# Patient Record
Sex: MALE
[De-identification: names, ages, dates, MRNs, and addresses within clinical notes are randomized per-mention and may not be internally consistent; named-entity substitution may affect disease eponyms.]

---

## 2024-04-26 ENCOUNTER — APPOINTMENT (OUTPATIENT)
Dept: UROLOGY | Facility: CLINIC | Age: 24
End: 2024-04-26
Payer: COMMERCIAL

## 2024-04-26 VITALS
TEMPERATURE: 98 F | SYSTOLIC BLOOD PRESSURE: 123 MMHG | DIASTOLIC BLOOD PRESSURE: 80 MMHG | WEIGHT: 198 LBS | OXYGEN SATURATION: 98 % | HEIGHT: 72 IN | BODY MASS INDEX: 26.82 KG/M2 | HEART RATE: 57 BPM

## 2024-04-26 DIAGNOSIS — Z78.9 OTHER SPECIFIED HEALTH STATUS: ICD-10-CM

## 2024-04-26 DIAGNOSIS — R68.82 DECREASED LIBIDO: ICD-10-CM

## 2024-04-26 DIAGNOSIS — E29.1 TESTICULAR HYPOFUNCTION: ICD-10-CM

## 2024-04-26 DIAGNOSIS — F52.32 MALE ORGASMIC DISORDER: ICD-10-CM

## 2024-04-26 PROBLEM — Z00.00 ENCOUNTER FOR PREVENTIVE HEALTH EXAMINATION: Status: ACTIVE | Noted: 2024-04-26

## 2024-04-26 LAB
BILIRUB UR QL STRIP: NORMAL
COLLECTION METHOD: NORMAL
GLUCOSE UR-MCNC: NORMAL
HCG UR QL: 0.2 EU/DL
HGB UR QL STRIP.AUTO: NORMAL
KETONES UR-MCNC: NORMAL
LEUKOCYTE ESTERASE UR QL STRIP: NORMAL
NITRITE UR QL STRIP: NORMAL
PH UR STRIP: 6
PROT UR STRIP-MCNC: NORMAL
SP GR UR STRIP: 1.02

## 2024-04-26 PROCEDURE — G2211 COMPLEX E/M VISIT ADD ON: CPT | Mod: NC,1L

## 2024-04-26 PROCEDURE — 81003 URINALYSIS AUTO W/O SCOPE: CPT | Mod: QW

## 2024-04-26 PROCEDURE — 99204 OFFICE O/P NEW MOD 45 MIN: CPT

## 2024-04-26 NOTE — PHYSICAL EXAM
[General Appearance - Well Developed] : well developed [General Appearance - Well Nourished] : well nourished [Normal Appearance] : normal appearance [Well Groomed] : well groomed [General Appearance - In No Acute Distress] : no acute distress [Edema] : no peripheral edema [Respiration, Rhythm And Depth] : normal respiratory rhythm and effort [Exaggerated Use Of Accessory Muscles For Inspiration] : no accessory muscle use [Abdomen Soft] : soft [Abdomen Tenderness] : non-tender [Costovertebral Angle Tenderness] : no ~M costovertebral angle tenderness [Urethral Meatus] : meatus normal [Penis Abnormality] : normal circumcised penis [Urinary Bladder Findings] : the bladder was normal on palpation [Scrotum] : the scrotum was normal [Rectal Exam - Seminal Vesicles] : the seminal vesicles were normal [Epididymis] : the epididymides were normal [Testes Tenderness] : no tenderness of the testes [Testes Mass (___cm)] : there were no testicular masses [Normal Station and Gait] : the gait and station were normal for the patient's age [] : no rash [No Focal Deficits] : no focal deficits [Oriented To Time, Place, And Person] : oriented to person, place, and time [Affect] : the affect was normal [Mood] : the mood was normal [Not Anxious] : not anxious [de-identified] : Normal testicles b/l

## 2024-04-26 NOTE — PLAN

## 2024-04-26 NOTE — HISTORY OF PRESENT ILLNESS
[FreeTextEntry1] : Love is a 23 year old male who presents for evaluation of orgasmic dysfunction. He states that starting around November 2023 he began having diminished sense of orgasm with no ejaculate. He reports that his libido has decreased. He is able to obtain and maintain and erection without much difficulty. Denies any issues with urination, dysuria, abdominal/flank pain, suprpubic discomfrot, pain with ejaculation, or further urological/constitutional symptoms.

## 2024-04-26 NOTE — ASSESSMENT
[FreeTextEntry1] : 1. Orgasmic dysfunction: Decreased sensation of ejaculation with decreased to no ejaculate. -Full hormone panel -HA1c, B12, Folate, Vit D -Seamen analysis -Post ejaculatory urinalysis -F/U for review  Primary anejaculation occurs when men were never able to ejaculate intravaginally.  Secondary anejaculation occurs when the patient has problems with intravaginal ejaculation after normal performance.  Ejaculatory disorders can be classified based on time, volume of ejaculation, and force of ejaculation.   Decreased volume of ejaculation is often the result of medication like alpha blockers or 5 alpha reductase inhibitors however it can also be related to poor arousal and affect your nerve pathways with abnormal function of seminal vesicles and prostate.  Pelvic floor dysfunction with lack of relaxation of pelvic floor can also decrease volume of ejaculation.  Seminal vesicles of obstruction either functional or anatomical can result in ejaculatory volume decreases.  Force of ejaculation depends on contractility of bulbocavernosus muscle, seminal vesicles and prostate.  Tensioning the pelvic floor, trying to push the semen out, low level of arousal can all affect force of ejaculation.  Abnormal closure of bladder neck, BPH are also associated with decreased force of ejaculation.   Evaluation focuses on identifying medical reasons like diabetes, heavy metal poisoning, alcohol abuse, sensory nerve dysfunction, abnormal hormonal profile.   Functional dynamic ejaculatory study allows for direct observation of pattern of stimulation, and answer the question if patient have idiosyncratic pattern of stimulation or tenses up pelvic floor muscles.  Transrectal ultrasound with ejaculatory study allows to better identify if functional or anatomical obstruction exists.   There is no FDA approved medical treatment for ejaculatory dysfunction at this point.   Correction of testosterone has been shown to improve volume of ejaculation, time to ejaculation, and force of ejaculation.

## 2024-06-07 ENCOUNTER — APPOINTMENT (OUTPATIENT)
Dept: UROLOGY | Facility: CLINIC | Age: 24
End: 2024-06-07